# Patient Record
Sex: MALE | Race: ASIAN | NOT HISPANIC OR LATINO | Employment: UNEMPLOYED | URBAN - METROPOLITAN AREA
[De-identification: names, ages, dates, MRNs, and addresses within clinical notes are randomized per-mention and may not be internally consistent; named-entity substitution may affect disease eponyms.]

---

## 2017-10-20 ENCOUNTER — HOSPITAL ENCOUNTER (EMERGENCY)
Facility: HOSPITAL | Age: 27
Discharge: HOME | End: 2017-10-20
Attending: EMERGENCY MEDICINE

## 2017-10-20 VITALS
HEART RATE: 80 BPM | DIASTOLIC BLOOD PRESSURE: 82 MMHG | SYSTOLIC BLOOD PRESSURE: 140 MMHG | OXYGEN SATURATION: 100 % | TEMPERATURE: 98 F | RESPIRATION RATE: 16 BRPM

## 2017-10-20 DIAGNOSIS — S13.9XXA NECK SPRAIN, INITIAL ENCOUNTER: ICD-10-CM

## 2017-10-20 DIAGNOSIS — M54.6 BILATERAL THORACIC BACK PAIN, UNSPECIFIED CHRONICITY: Primary | ICD-10-CM

## 2017-10-20 LAB
ALBUMIN SERPL BCP-MCNC: 4.1 G/DL
ALP SERPL-CCNC: 84 U/L
ALT SERPL W/O P-5'-P-CCNC: 18 U/L
ANION GAP SERPL CALC-SCNC: 5 MMOL/L
AST SERPL-CCNC: 15 U/L
BASOPHILS # BLD AUTO: 0.01 K/UL
BASOPHILS NFR BLD: 0.1 %
BILIRUB SERPL-MCNC: 0.4 MG/DL
BUN SERPL-MCNC: 8 MG/DL
CALCIUM SERPL-MCNC: 10.8 MG/DL
CHLORIDE SERPL-SCNC: 109 MMOL/L
CO2 SERPL-SCNC: 24 MMOL/L
CREAT SERPL-MCNC: 0.7 MG/DL
DIFFERENTIAL METHOD: ABNORMAL
EOSINOPHIL # BLD AUTO: 0.1 K/UL
EOSINOPHIL NFR BLD: 1.1 %
ERYTHROCYTE [DISTWIDTH] IN BLOOD BY AUTOMATED COUNT: 17.2 %
EST. GFR  (AFRICAN AMERICAN): >60 ML/MIN/1.73 M^2
EST. GFR  (NON AFRICAN AMERICAN): >60 ML/MIN/1.73 M^2
GLUCOSE SERPL-MCNC: 95 MG/DL
HCT VFR BLD AUTO: 43.9 %
HGB BLD-MCNC: 13.5 G/DL
IMM GRANULOCYTES # BLD AUTO: 0.03 K/UL
IMM GRANULOCYTES NFR BLD AUTO: 0.4 %
LYMPHOCYTES # BLD AUTO: 2.2 K/UL
LYMPHOCYTES NFR BLD: 26.5 %
MCH RBC QN AUTO: 20.1 PG
MCHC RBC AUTO-ENTMCNC: 30.8 G/DL
MCV RBC AUTO: 65 FL
MONOCYTES # BLD AUTO: 0.8 K/UL
MONOCYTES NFR BLD: 9.3 %
NEUTROPHILS # BLD AUTO: 5.2 K/UL
NEUTROPHILS NFR BLD: 62.6 %
NRBC BLD-RTO: 0 /100 WBC
PLATELET # BLD AUTO: 222 K/UL
PMV BLD AUTO: 10.2 FL
POTASSIUM SERPL-SCNC: 4.3 MMOL/L
PROT SERPL-MCNC: 7.4 G/DL
RBC # BLD AUTO: 6.71 M/UL
SODIUM SERPL-SCNC: 138 MMOL/L
WBC # BLD AUTO: 8.26 K/UL

## 2017-10-20 PROCEDURE — 85025 COMPLETE CBC W/AUTO DIFF WBC: CPT

## 2017-10-20 PROCEDURE — 99283 EMERGENCY DEPT VISIT LOW MDM: CPT | Mod: ,,, | Performed by: EMERGENCY MEDICINE

## 2017-10-20 PROCEDURE — 96374 THER/PROPH/DIAG INJ IV PUSH: CPT

## 2017-10-20 PROCEDURE — 99284 EMERGENCY DEPT VISIT MOD MDM: CPT | Mod: 25

## 2017-10-20 PROCEDURE — 63600175 PHARM REV CODE 636 W HCPCS: Performed by: EMERGENCY MEDICINE

## 2017-10-20 PROCEDURE — 96372 THER/PROPH/DIAG INJ SC/IM: CPT

## 2017-10-20 PROCEDURE — 80053 COMPREHEN METABOLIC PANEL: CPT

## 2017-10-20 RX ORDER — ORPHENADRINE CITRATE 30 MG/ML
30 INJECTION INTRAMUSCULAR; INTRAVENOUS
Status: COMPLETED | OUTPATIENT
Start: 2017-10-20 | End: 2017-10-20

## 2017-10-20 RX ORDER — MORPHINE SULFATE 2 MG/ML
6 INJECTION, SOLUTION INTRAMUSCULAR; INTRAVENOUS
Status: COMPLETED | OUTPATIENT
Start: 2017-10-20 | End: 2017-10-20

## 2017-10-20 RX ORDER — IBUPROFEN 600 MG/1
600 TABLET ORAL EVERY 6 HOURS PRN
Qty: 20 TABLET | Refills: 0 | Status: SHIPPED | OUTPATIENT
Start: 2017-10-20

## 2017-10-20 RX ORDER — METHOCARBAMOL 500 MG/1
1000 TABLET, FILM COATED ORAL 3 TIMES DAILY
Qty: 30 TABLET | Refills: 0 | Status: SHIPPED | OUTPATIENT
Start: 2017-10-20 | End: 2017-10-25

## 2017-10-20 RX ADMIN — MORPHINE SULFATE 6 MG: 2 INJECTION, SOLUTION INTRAMUSCULAR; INTRAVENOUS at 07:10

## 2017-10-20 RX ADMIN — ORPHENADRINE CITRATE 30 MG: 30 INJECTION INTRAMUSCULAR; INTRAVENOUS at 07:10

## 2017-10-20 NOTE — ED PROVIDER NOTES
"Encounter Date: 10/20/2017    SCRIBE #1 NOTE: I, Kel Beltrán, am scribing for, and in the presence of,  Dr. Upton . I have scribed the entire note.       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 male with no pertinent hx that presents to the ED with a complaint of a fall. 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 He states he hit the middle of his back with the corner of the shelves. He endorses neck pain and shoulder pain and states his entire back is "on fire". He denies being able to walk after the fall because of pain and since it was a tight area where he fell, he didn't want to make a wrong move. He denies LOC, fever, chills, numbness or tingling in legs, and loss of sensation in genital area, but endorses loss of sensation in his rectal area.         The history is provided by the patient.     Review of patient's allergies indicates:  No Known Allergies  History reviewed. No pertinent past medical history.  History reviewed. No pertinent surgical history.  History reviewed. No pertinent family history.  Social History   Substance Use Topics    Smoking status: Current Some Day Smoker    Smokeless tobacco: Not on file    Alcohol use Yes      Comment: Socially     Review of Systems   Constitutional: Negative for chills and fever.   HENT: Negative for sore throat.    Respiratory: Negative for shortness of breath.    Cardiovascular: Negative for chest pain.   Gastrointestinal: Negative for nausea.   Genitourinary: Negative for dysuria.   Musculoskeletal: Positive for back pain and neck pain.        Pt endorses shoulder pain    Skin: Negative for rash.   Neurological: Negative for syncope, weakness and numbness.        " Pt denies tingling in lower extremities.Pt endorses loss of sensation in rectal area, but denies loss of sensation in genital area.    Hematological: Does not bruise/bleed easily.       Physical Exam     Initial Vitals [10/20/17 0541]   BP Pulse Resp Temp SpO2   138/78 78 18 98.5 °F (36.9 °C) 99 %      MAP       98         Physical Exam    Nursing note and vitals reviewed.  Constitutional: He appears well-developed and well-nourished. He is not diaphoretic. He appears distressed (Mild painful distress ).   HENT:   Head: Normocephalic and atraumatic.   Mouth/Throat: Oropharynx is clear and moist.   Eyes: EOM are normal. Pupils are equal, round, and reactive to light.   Neck: Normal range of motion. Neck supple. No JVD present.   Cardiovascular: Normal rate, regular rhythm, normal heart sounds and intact distal pulses.   Pulmonary/Chest: Breath sounds normal. No respiratory distress. He has no wheezes. He has no rhonchi. He has no rales.   Abdominal: Soft. He exhibits no distension. There is no tenderness.   Genitourinary:   Genitourinary Comments: Rectal tone in tact, sensation in tact, pt has no perianal anesthesia.    Musculoskeletal: Normal range of motion. He exhibits tenderness. He exhibits no edema.   There is midline cervical tenderness, no step off, pt has midline thoracic and lumbar tenderness    Lymphadenopathy:     He has no cervical adenopathy.   Neurological: He is alert and oriented to person, place, and time. He has normal strength. No cranial nerve deficit or sensory deficit.   Distal pulses in tact. Pt has 5/5 upper and lower extremity sensation in tact   Skin: Skin is warm and dry.         ED Course   Procedures  Labs Reviewed   CBC W/ AUTO DIFFERENTIAL - Abnormal; Notable for the following:        Result Value    RBC 6.71 (*)     Hemoglobin 13.5 (*)     MCV 65 (*)     MCH 20.1 (*)     MCHC 30.8 (*)     RDW 17.2 (*)     All other components within normal limits   COMPREHENSIVE METABOLIC PANEL -  Abnormal; Notable for the following:     Calcium 10.8 (*)     Anion Gap 5 (*)     All other components within normal limits          X-Rays:   Independently Interpreted Readings:   Head CT: No acute to cranial bleed or skull fracture   Other Readings:  Cervical, thoracic, lumbar spine: No acute fracture, dislocation, subluxation    Medical Decision Making:   History:   Old Medical Records: I decided to obtain old medical records.  Initial Assessment:   Emergent evaluation of a 27 y.o. male s/p fall from shelves. My initial differential diagnoses include but are not limited to: vertebral fracture, hematoma, muscle spasm, less likely cauda equina and less likely cord compression as he is neurologically in tact. Will image his spine and treat with muscle relaxer.       Labs show no significant abnormalities, vital signs are stable. CT of cervical/thoracic/ lumbar spine show no evidence of acute fracture or subluxation. Will discharge with pain medication and muscle relaxer.   Clinical Tests:   Lab Tests: Ordered and Reviewed  Radiological Study: Ordered and Reviewed            Scribe Attestation:   Scribe #1: I performed the above scribed service and the documentation accurately describes the services I performed. I attest to the accuracy of the note.    Attending Attestation:           Physician Attestation for Scribe:      Comments: I, Dr. Luba Upton, personally performed the services described in this documentation. All medical record entries made by the scribe were at my direction and in my presence.  I have reviewed the chart and agree that the record reflects my personal performance and is accurate and complete. Luba Upton MD.  2:23 PM 10/20/2017            ED Course      Clinical Impression:   The primary encounter diagnosis was Bilateral thoracic back pain, unspecified chronicity. A diagnosis of Neck sprain, initial encounter was also pertinent to this visit.    Disposition:   Disposition:  Discharged  Condition: Stable                        Luba Upton MD  10/20/17 1425       Luba Upton MD  10/20/17 1426

## 2017-10-20 NOTE — ED TRIAGE NOTES
Aj Vazquez, a 27 y.o. male presents to the ED after falling from shelves at work about 7 ft onto back. Pt complaining of back/neck pain. Pt arrived on back board and in c-collar. Denies LOC, numbness or tingling in legs or arms. Pt AAOx4. Tenderness to neck and upper midback.       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 No pertinent past medical history.     Adult Physical Assessment  LOC: Aj Vazquez, 27 y.o. male verified via two identifiers.  The patient is awake, alert, oriented and speaking appropriately at this time.  APPEARANCE: Patient resting comfortably and appears to be in no acute distress at this time. Patient is clean and well groomed, patient's clothing is properly fastened.  SKIN:The skin is warm and dry, color consistent with ethnicity, patient has normal skin turgor and moist mucus membranes, skin intact, no breakdown or brusing noted.  MUSCULOSKELETAL: Patient moving all extremities, denies numbness, tingling. C-collar in place. Tenderness to mid neck and upper back. No obvious swelling or deformities noted.  RESPIRATORY: Airway is open and patent, respirations are spontaneous, patient has a normal effort and rate, no accessory muscle use noted.  CARDIAC: Patient has a normal rate and rhythm, no periphreal edema noted in any extremity, capillary refill < 3 seconds in all extremities  ABDOMEN: Soft and non tender to palpation, no abdominal distention noted. Bowel sounds present in all four quadrants.  NEUROLOGIC: Eyes open spontaneously, behavior appropriate to situation, follows commands, facial expression symmetrical, bilateral hand grasp equal and even, purposeful motor response noted, normal sensation in all extremities when touched with a finger.

## 2024-10-09 ENCOUNTER — PATIENT OUTREACH (OUTPATIENT)
Dept: ADMINISTRATIVE | Facility: OTHER | Age: 34
End: 2024-10-09
Payer: MEDICAID

## 2024-10-09 NOTE — PROGRESS NOTES
CHW - Initial Contact    This Community Health Worker completed the Social Determinant of Health questionnaire with patient via telephone today.    Pt identified barriers of most importance are: No barriers reported.   Referrals to community agencies completed with patient/caregiver consent outside of [facility] include: No.  Referrals were put through [facility] - no:   Support and Services: No.  Other information discussed the patient needs / wants help with: SDOH completed. Patient did not report any barriers at this time, case will be closed.    Follow up required:No.

## 2024-10-10 ENCOUNTER — OFFICE VISIT (OUTPATIENT)
Dept: PRIMARY CARE CLINIC | Facility: CLINIC | Age: 34
End: 2024-10-10
Payer: MEDICAID

## 2024-10-10 VITALS
BODY MASS INDEX: 29.76 KG/M2 | TEMPERATURE: 98 F | OXYGEN SATURATION: 99 % | HEIGHT: 67 IN | SYSTOLIC BLOOD PRESSURE: 128 MMHG | WEIGHT: 189.63 LBS | RESPIRATION RATE: 18 BRPM | DIASTOLIC BLOOD PRESSURE: 81 MMHG | HEART RATE: 75 BPM

## 2024-10-10 DIAGNOSIS — M54.2 NECK PAIN: Primary | ICD-10-CM

## 2024-10-10 PROCEDURE — 99999 PR PBB SHADOW E&M-EST. PATIENT-LVL V: CPT | Mod: PBBFAC,,,

## 2024-10-10 PROCEDURE — 99215 OFFICE O/P EST HI 40 MIN: CPT | Mod: PBBFAC,PN

## 2024-10-10 RX ORDER — GABAPENTIN 100 MG/1
100 CAPSULE ORAL NIGHTLY
Qty: 90 CAPSULE | Refills: 0 | Status: SHIPPED | OUTPATIENT
Start: 2024-10-10 | End: 2025-01-08

## 2024-10-10 NOTE — PROGRESS NOTES
"  Subjective:       Patient ID: Aj Dunham is a 34 y.o. male.    Chief Complaint: Neck Pain and Back Pain      Aj Dunham is a 34 y.o. year old male  who comes to clinic for neck pain. Pt [place] speaking, used  services [facility] #[id number]    Patient complains of pain that is localized to the neck, radiating to bilateral hands and fingertips on occasion, rates as intensity 10/10, described as achy, symptoms have been present for 3 years but has significantly worsened over the past 3 months.  He works as a  in [employer]  Initial inciting event:  pt had accident in 2017, he fell on his back .   Symptoms are worst: throughout the day.   Alleviating factors identifiable by patient are  stretching .   Exacerbating factors identifiable by patient are  prolonged periods of immobilization .   Treatments so far initiated by patient:  NSAIDs    Previous workup: none.       ROS negative except as above  Objective:      /81   Pulse 75   Temp 98.3 °F (36.8 °C) (Oral)   Resp 18   Ht 5' 7" (1.702 m)   Wt 86 kg (189 lb 9.5 oz)   SpO2 99%   BMI 29.69 kg/m²    Physical Exam  Vitals reviewed.   Constitutional:       Appearance: Normal appearance.   HENT:      Head: Normocephalic.      Mouth/Throat:      Mouth: Mucous membranes are moist.   Neck:      Comments: Cervical spine exam:    Range of motion: full active and non-tender passive ROM on flexion, extension, rotation, lateral bending with negative pain on examination  Strength: full strength in hands and arms    Cardiovascular:      Rate and Rhythm: Normal rate and regular rhythm.      Pulses: Normal pulses.      Heart sounds: Normal heart sounds.   Pulmonary:      Effort: Pulmonary effort is normal.      Breath sounds: Normal breath sounds. No wheezing or rhonchi.   Abdominal:      General: Abdomen is flat. There is no distension.   Musculoskeletal:         General: No swelling. Normal range of motion.      Cervical back: Normal range of motion. No " tenderness.   Skin:     General: Skin is warm.      Coloration: Skin is not jaundiced.   Neurological:      Mental Status: He is alert.         Assessment:       1. Neck pain        Plan:       1. Neck pain (Primary)  -     chronic condition not at goal  - Will do pain medication and PT therapy and xrays. If no improvement in 3 months, will do MRI  - Discussed non-operative treatment options for addressing chronic neck pains. Including gabapentin/pregabalin to address nerve root irritation, anti-inflammatories (mobic, celebrex, ibuprofen, naproxen), physical therapy  - Provided with handout on neck exercises until he is able to see PT  - X-Ray Cervical Spine 2 or 3 Views; Future  - Ambulatory referral/consult to Physical/Occupational Therapy; Future  - gabapentin (NEURONTIN) 100 MG capsule; Take 1 capsule (100 mg total) by mouth every evening. Do not take if you will be driving that day.  Dispense: 90 capsule; Refill: 0          Follow up in about 3 months (around 1/10/2025) for wellness visit.      Yoandy Guevara M.D.

## 2024-10-14 ENCOUNTER — HOSPITAL ENCOUNTER (OUTPATIENT)
Dept: RADIOLOGY | Facility: HOSPITAL | Age: 34
Discharge: HOME | End: 2024-10-14
Payer: MEDICAID

## 2024-10-14 DIAGNOSIS — M54.2 NECK PAIN: ICD-10-CM

## 2024-10-14 PROCEDURE — 72040 X-RAY EXAM NECK SPINE 2-3 VW: CPT | Mod: TC

## 2024-10-14 PROCEDURE — 72040 X-RAY EXAM NECK SPINE 2-3 VW: CPT | Mod: 26,,, | Performed by: RADIOLOGY

## 2024-10-28 ENCOUNTER — CLINICAL SUPPORT (OUTPATIENT)
Dept: REHABILITATION | Facility: HOSPITAL | Age: 34
End: 2024-10-28
Payer: MEDICAID

## 2024-10-28 DIAGNOSIS — M43.6 LIMITATION OF JOINT MOTION OF NECK: Primary | ICD-10-CM

## 2024-10-28 DIAGNOSIS — M54.2 NECK PAIN: ICD-10-CM

## 2024-10-28 DIAGNOSIS — R29.898 SHOULDER WEAKNESS: ICD-10-CM

## 2024-10-28 DIAGNOSIS — R27.8 ABNORMAL MOTOR COORDINATION: ICD-10-CM

## 2024-10-28 PROCEDURE — 97161 PT EVAL LOW COMPLEX 20 MIN: CPT

## 2024-11-03 NOTE — PROGRESS NOTES
[facility]   Physical Therapy Treatment Note      Name: Aj Dunham  Clinic Number: [id number]    Therapy Diagnosis:   Encounter Diagnoses   Name Primary?    Limitation of joint motion of neck Yes    Shoulder weakness     Abnormal motor coordination      Physician: Yoandy Guevara MD    Visit Date: 11/4/2024  Physician Orders: PT Eval and Treat   Medical Diagnosis from Referral:   M54.2 (ICD-10-CM) - Neck pain   Evaluation Date: 10/28/2024  Authorization Period Expiration: 10/10/2025  Plan of Care Expiration: 12/28/2024  Progress Note Due: 11/28/2024  Visit # / Visits authorized: 1/1 1/20  FOTO: 1/3    PTA Visit #: 0/5     Precautions: Standard     Time In: 3:04 pm   Time Out: 4:00 pm   Total Billable Time: 56 minutes (39 Minutes One on One)     Subjective     Pt reports: That he is feeling slightly better but still has a fair amount of pain in the neck.  It really can get irritated at different times but over the weekend when he was driving he was able to manage it fairly well.    He was compliant with home exercise program.  Response to previous treatment: Slight Improvement in Pain levels   Functional change: Less Pain with Driving     Pain: Variable/10  Location: Neck and CT Junction      Objective      Objective Measures updated at progress report unless specified.     Treatment     Aj received the treatments listed below:      therapeutic exercises to develop strength, endurance, ROM, flexibility, and core stabilization for 39 minutes including:    UBE 1 Min FWD and 1 Min BWD 8 Min Level 1.5   Prone Mid-Trap Level 1 3 x 8 3 Sec Holds   Chin Tuck with Biofeedback 12 x 10 Sec Holds   Serratus Wall Slides 2 x 12   Upper Trap Level 3 2 x 12 3 Sec Holds   Hand Heel Rock Back (Cue for Cervical Posture) 2 x 12   Horizontal Abduction/Ts GTB (Cue for Scapular Squeeze) 3 x 10  No Money's GTB 2 x 12       manual therapy techniques: The techniques below were applied for 17 minutes:      OA Flexion Grade III-IV   OA Flexion MET   Cervical Right Side Glide C4-C6   Prone Thoracic PA Grade III-IV   Prone CT Distraction Grade II-III  Unilateral CT Mobilization Grade III       hot pack for 0 minutes to .    cold pack for 0 minutes to .    Patient Education and Home Exercises       Education provided:   - HEP   - Plan of Care   - Explanation of Findings   - Upper Crossed Syndrome   - Resulting Irritation From Excessive Mobility in One Hinge Point     Written Home Exercises Provided: Patient instructed to cont prior HEP. Exercises were reviewed and Aj was able to demonstrate them prior to the end of the session.  Aj demonstrated good  understanding of the education provided. See EMR under Patient Instructions for exercises provided during therapy sessions    Assessment     Aj presented to physical therapy with slight improvements from initial evaluation.  He continued to have severe limited CT junction and upper thoracic mobility with decreased right side glide mobility centered around C5.  Mobilization to C5 did provide some radiating symptoms down the right arm.  He continues to have significant motor control impairments with cervical extension and shows extreme tendency to fall into cervical extension with motion.  He worked to improve deep neck flexor strengthening which was very challenging and he had a lot of difficulty maintaining activation and positioning.  He worked to improver periscapular strengthening with overhead motion with specific care to limit and prevent compensation through the cervical spine and TL junction and to maintain a good neutral spine.  He completed the session with good levels of muscle fatigue and showed better ability to maintain control through the neck.  He will continue to need to build strengthening and control through the cervical spine while improve periscapular control and to offload the strain placed on the neck.  He will continue to be progressed as  appropriate.     Aj Is progressing well towards his goals.   Pt prognosis is Good.     Pt will continue to benefit from skilled outpatient physical therapy to address the deficits listed in the problem list box on initial evaluation, provide pt/family education and to maximize pt's level of independence in the home and community environment.     Pt's spiritual, cultural and educational needs considered and pt agreeable to plan of care and goals.     Anticipated barriers to physical therapy: Schedule     Goals:  Short Term Goals (4 Weeks):   1. Pt will be independent with HEP to supplement PT in improving pain free cervical mobility  2. Pt will improve cervical AROM quality of motion for 10 deg in flexion and side bending planes to improve cervical mobility for driving  3. Pt will improve UE MMTs by 1/2 grade in all planes to improve strength for lifting and carrying tasks.  4. Pt will demonstrate improved sitting posture to decrease pain experienced in head and neck.  Long Term Goals (8 Weeks):   1. Pt will improve FOTO to </=63% limitation to improve perceived limitation with changing and maintaining mobility.  2. Pt will improve cervical AROM to WNL in all planes to improve cervical mobility for driving   3. Pt will improve UE MMTs 1 grade in all planes to improve strength for lifting and carrying tasks.  4. Pt will demonstrate ability to hold 30 mmHg with deep neck flexion intervention to demonstrate improved deep cervical neck flexion.     5. Pt will report no pain with cervical AROM in all planes to promote QOL.   Plan      Plan of care Certification: 10/28/2024 to 12/28/2024.     Outpatient Physical Therapy 2 times weekly for 8 weeks to include the following interventions: Cervical/Lumbar Traction, Electrical Stimulation , Manual Therapy, Moist Heat/ Ice, Neuromuscular Re-ed, Orthotic Management and Training, Patient Education, Self Care, Therapeutic Activities, and Therapeutic Exercise.        José  Caio, PT

## 2024-11-04 ENCOUNTER — CLINICAL SUPPORT (OUTPATIENT)
Dept: REHABILITATION | Facility: HOSPITAL | Age: 34
End: 2024-11-04
Payer: MEDICAID

## 2024-11-04 DIAGNOSIS — R27.8 ABNORMAL MOTOR COORDINATION: ICD-10-CM

## 2024-11-04 DIAGNOSIS — M43.6 LIMITATION OF JOINT MOTION OF NECK: Primary | ICD-10-CM

## 2024-11-04 DIAGNOSIS — R29.898 SHOULDER WEAKNESS: ICD-10-CM

## 2024-11-04 PROCEDURE — 97110 THERAPEUTIC EXERCISES: CPT

## 2024-11-11 ENCOUNTER — CLINICAL SUPPORT (OUTPATIENT)
Dept: REHABILITATION | Facility: HOSPITAL | Age: 34
End: 2024-11-11
Payer: MEDICAID

## 2024-11-11 DIAGNOSIS — M43.6 LIMITATION OF JOINT MOTION OF NECK: Primary | ICD-10-CM

## 2024-11-11 DIAGNOSIS — R27.8 ABNORMAL MOTOR COORDINATION: ICD-10-CM

## 2024-11-11 DIAGNOSIS — R29.898 SHOULDER WEAKNESS: ICD-10-CM

## 2024-11-11 PROCEDURE — 97110 THERAPEUTIC EXERCISES: CPT

## 2024-11-11 NOTE — PROGRESS NOTES
[facility]   Physical Therapy Treatment Note      Name: Aj Dunham  Clinic Number: [id number]    Therapy Diagnosis:   Encounter Diagnoses   Name Primary?    Limitation of joint motion of neck Yes    Shoulder weakness     Abnormal motor coordination      Physician: Yoandy Guevara MD    Visit Date: 11/11/2024  Physician Orders: PT Eval and Treat   Medical Diagnosis from Referral:   M54.2 (ICD-10-CM) - Neck pain   Evaluation Date: 10/28/2024  Authorization Period Expiration: 10/10/2025  Plan of Care Expiration: 12/28/2024  Progress Note Due: 11/28/2024  Visit # / Visits authorized: 1/1 2/20  FOTO: 1/3    PTA Visit #: 0/5     Precautions: Standard     Time In: 3:08 pm   Time Out: 4:01 pm   Total Billable Time: 53 minutes (53 Minutes One on One)     Subjective     Pt reports: That he is very tired and had a long weekend but he might feel overall about 20% better through the neck.  He has less severity and frequency of the stabbing pain in the neck with different movements.      He was compliant with home exercise program.  Response to previous treatment: Slight Improvement in Pain levels   Functional change: Less Pain with Driving     Pain: Variable/10  Location: Neck and CT Junction      Objective      Objective Measures updated at progress report unless specified.     Treatment     Aj received the treatments listed below:      therapeutic exercises to develop strength, endurance, ROM, flexibility, and core stabilization for 39 minutes including:    UBE 1 Min FWD and 1 Min BWD 8 Min Level 3  Prone Mid-Trap Level 1 3 x 8 3 Sec Holds   Prone Low-Trap Level 2 3 x 8 3 Sec Holds   Chin Tuck with Biofeedback 10 x 10 Sec Holds   Chin Tuck with Biofeedback and UE Flexion 1 x 12   Serratus Wall Slides 2 x 12   Low Trap Lift Off/Y's YTB 2 x 12   Horizontal Abduction/Ts GTB (Cue for Scapular Squeeze) 3 x 10  Open Books GTB 1 x 15 Each Way   Upper Trap Level 3 2 x 12 3 Sec Holds     Not Today:    Hand Heel Rock Back (Cue for Cervical Posture) 2 x 12   No Money's GTB 2 x 12     manual therapy techniques: The techniques below were applied for 14 minutes:     OA Flexion Grade III-IV   OA Flexion MET   Cervical Right Side Glide C4-C6   Prone Thoracic PA Grade III-IV   Prone CT Distraction Grade II-III    Not Today:   Unilateral CT Mobilization Grade III       hot pack for 0 minutes to .    cold pack for 0 minutes to .    Patient Education and Home Exercises       Education provided:   - HEP   - Plan of Care   - Explanation of Findings   - Upper Crossed Syndrome   - Resulting Irritation From Excessive Mobility in One Hinge Point     Written Home Exercises Provided: Patient instructed to cont prior HEP. Exercises were reviewed and Aj was able to demonstrate them prior to the end of the session.  Aj demonstrated good  understanding of the education provided. See EMR under Patient Instructions for exercises provided during therapy sessions    Assessment     Aj presented to physical therapy with a self-reported 20% improvement in symptoms.  He continues to have severe shearing into extension at C6 with limited mobility through the CT junction and upper cervical spine.  Manual interventions were performed to address limitation of OA flexion and upper cervical side glides as well at upper thoracic motion.  He continued to work to address deep neck flexor strengthening and control with progression to include upper extremity motion.   Periscapular strengthening continued to be a primary focus of session to address posture and decrease resting strain placed on the spine.  With overhead motions, cueing was provided to maintain a good neutral spine with decrease irritation improved motor control.  He showed improvements in control and strengthening and demonstrated expected fatigue.  He will continue to need to build strengthening and control through the cervical spine while improve periscapular control and to  offload the strain placed on the neck.  He will continue to be progressed as appropriate.     Aj Is progressing well towards his goals.   Pt prognosis is Good.     Pt will continue to benefit from skilled outpatient physical therapy to address the deficits listed in the problem list box on initial evaluation, provide pt/family education and to maximize pt's level of independence in the home and community environment.     Pt's spiritual, cultural and educational needs considered and pt agreeable to plan of care and goals.     Anticipated barriers to physical therapy: Schedule     Goals:  Short Term Goals (4 Weeks):   1. Pt will be independent with HEP to supplement PT in improving pain free cervical mobility  2. Pt will improve cervical AROM quality of motion for 10 deg in flexion and side bending planes to improve cervical mobility for driving  3. Pt will improve UE MMTs by 1/2 grade in all planes to improve strength for lifting and carrying tasks.  4. Pt will demonstrate improved sitting posture to decrease pain experienced in head and neck.  Long Term Goals (8 Weeks):   1. Pt will improve FOTO to </=63% limitation to improve perceived limitation with changing and maintaining mobility.  2. Pt will improve cervical AROM to WNL in all planes to improve cervical mobility for driving   3. Pt will improve UE MMTs 1 grade in all planes to improve strength for lifting and carrying tasks.  4. Pt will demonstrate ability to hold 30 mmHg with deep neck flexion intervention to demonstrate improved deep cervical neck flexion.     5. Pt will report no pain with cervical AROM in all planes to promote QOL.   Plan      Plan of care Certification: 10/28/2024 to 12/28/2024.     Outpatient Physical Therapy 2 times weekly for 8 weeks to include the following interventions: Cervical/Lumbar Traction, Electrical Stimulation , Manual Therapy, Moist Heat/ Ice, Neuromuscular Re-ed, Orthotic Management and Training, Patient Education,  Self Care, Therapeutic Activities, and Therapeutic Exercise.        José Kearney, PT

## 2024-11-18 ENCOUNTER — CLINICAL SUPPORT (OUTPATIENT)
Dept: REHABILITATION | Facility: HOSPITAL | Age: 34
End: 2024-11-18
Payer: MEDICAID

## 2024-11-18 DIAGNOSIS — R29.898 SHOULDER WEAKNESS: ICD-10-CM

## 2024-11-18 DIAGNOSIS — R27.8 ABNORMAL MOTOR COORDINATION: ICD-10-CM

## 2024-11-18 DIAGNOSIS — M43.6 LIMITATION OF JOINT MOTION OF NECK: Primary | ICD-10-CM

## 2024-11-18 PROCEDURE — 97110 THERAPEUTIC EXERCISES: CPT

## 2024-11-18 NOTE — PROGRESS NOTES
[facility]   Physical Therapy Treatment Note      Name: Aj Dunham  Clinic Number: [id number]    Therapy Diagnosis:   Encounter Diagnoses   Name Primary?    Limitation of joint motion of neck Yes    Shoulder weakness     Abnormal motor coordination        Physician: Yoandy Guevara MD    Visit Date: 11/18/2024  Physician Orders: PT Eval and Treat   Medical Diagnosis from Referral:   M54.2 (ICD-10-CM) - Neck pain   Evaluation Date: 10/28/2024  Authorization Period Expiration: 10/10/2025  Plan of Care Expiration: 12/28/2024  Progress Note Due: 11/28/2024  Visit # / Visits authorized: 1/1 3/20  FOTO: 1/3    PTA Visit #: 0/5     Precautions: Standard     Time In: 302pm  Time Out: 350pm  Total Billable Time: 48 minutes    Subjective     Pt reports: that his neck is doing fine today. He still has the pain in the back of the neck at this time. Overall he feels like he may has made some progress with treatment but he still gets his neck pain.   He was compliant with home exercise program.  Response to previous treatment: Slight Improvement in Pain levels   Functional change: Less Pain with Driving     Pain: Variable/10  Location: Neck and CT Junction      Objective      Objective Measures updated at progress report unless specified.     Treatment     Aj received the treatments listed below:      therapeutic exercises to develop strength, endurance, ROM, flexibility, and core stabilization for 36 minutes including:    UBE, 3' forward and backward with level 3 resistance   Seated chicken wings, 2 x 10  Open books, 15x each side   Seated thoracic extension over a chair, 3 x 8  Chin Tuck with Biofeedback 10 x 10 Sec Holds   Chin Tuck with Biofeedback and UE Flexion with 2#  1 x 15 each side  Serratus Wall Slides 2 x 12   Prone Mid-Trap Level 2 3 x 8 3 Sec Holds   Prone Low-Trap Level 2 3 x 8 3 Sec Holds     Not Today:   Low Trap Lift Off/Y's YTB 2 x 12   Horizontal Abduction/Ts GTB (Cue for  "Scapular Squeeze) 3 x 10  Upper Trap Level 3 2 x 12 3 Sec Holds   Hand Heel Rock Back (Cue for Cervical Posture) 2 x 12   No Money's GTB 2 x 12     manual therapy techniques: The techniques below were applied for 12 minutes:     OA Flexion Grade III-IV   MET to improve upper cervical rotation to the R and L, 4 x 5" holds  Cervical Right Side Glide C4-C6   Prone Thoracic PA Grade III-IV   Prone CT Distraction Grade II-III    Not Today:   OA Flexion MET   Unilateral CT Mobilization Grade III       Patient Education and Home Exercises       Education provided:   - HEP   - Plan of Care   - Explanation of Findings   - Upper Crossed Syndrome   - Resulting Irritation From Excessive Mobility in One Hinge Point     Written Home Exercises Provided: Patient instructed to cont prior HEP. Exercises were reviewed and Aj was able to demonstrate them prior to the end of the session.  Aj demonstrated good  understanding of the education provided. See EMR under Patient Instructions for exercises provided during therapy sessions    Assessment     Aj presents today with continued reports of cervical pain that does worsen with AROM. His CT junction pain that is reproduced with cervical flexion did respond well to manual interventions today. He struggled a but with thoracic spine manual interventions due to TTP so PT's force was modified accordingly. Deep neck flexor and periscapular control activities were challenging for him today.    Aj Is progressing well towards his goals.   Pt prognosis is Good.     Pt will continue to benefit from skilled outpatient physical therapy to address the deficits listed in the problem list box on initial evaluation, provide pt/family education and to maximize pt's level of independence in the home and community environment.     Pt's spiritual, cultural and educational needs considered and pt agreeable to plan of care and goals.     Anticipated barriers to physical therapy: Schedule "     Goals:  Short Term Goals (4 Weeks):   1. Pt will be independent with HEP to supplement PT in improving pain free cervical mobility  2. Pt will improve cervical AROM quality of motion for 10 deg in flexion and side bending planes to improve cervical mobility for driving  3. Pt will improve UE MMTs by 1/2 grade in all planes to improve strength for lifting and carrying tasks.  4. Pt will demonstrate improved sitting posture to decrease pain experienced in head and neck.  Long Term Goals (8 Weeks):   1. Pt will improve FOTO to </=63% limitation to improve perceived limitation with changing and maintaining mobility.  2. Pt will improve cervical AROM to WNL in all planes to improve cervical mobility for driving   3. Pt will improve UE MMTs 1 grade in all planes to improve strength for lifting and carrying tasks.  4. Pt will demonstrate ability to hold 30 mmHg with deep neck flexion intervention to demonstrate improved deep cervical neck flexion.     5. Pt will report no pain with cervical AROM in all planes to promote QOL.   Plan      Plan of care Certification: 10/28/2024 to 12/28/2024.     Outpatient Physical Therapy 2 times weekly for 8 weeks to include the following interventions: Cervical/Lumbar Traction, Electrical Stimulation , Manual Therapy, Moist Heat/ Ice, Neuromuscular Re-ed, Orthotic Management and Training, Patient Education, Self Care, Therapeutic Activities, and Therapeutic Exercise.      TOMY MICHAEL, PT

## 2025-01-10 ENCOUNTER — OFFICE VISIT (OUTPATIENT)
Dept: PRIMARY CARE CLINIC | Facility: CLINIC | Age: 35
End: 2025-01-10
Payer: MEDICAID

## 2025-01-10 VITALS
HEART RATE: 69 BPM | HEIGHT: 67 IN | WEIGHT: 193.25 LBS | DIASTOLIC BLOOD PRESSURE: 69 MMHG | OXYGEN SATURATION: 97 % | SYSTOLIC BLOOD PRESSURE: 112 MMHG | BODY MASS INDEX: 30.33 KG/M2

## 2025-01-10 DIAGNOSIS — Z11.4 ENCOUNTER FOR SCREENING FOR HIV: ICD-10-CM

## 2025-01-10 DIAGNOSIS — Z23 FLU VACCINE NEED: ICD-10-CM

## 2025-01-10 DIAGNOSIS — M54.6 ACUTE MIDLINE THORACIC BACK PAIN: ICD-10-CM

## 2025-01-10 DIAGNOSIS — Z11.59 ENCOUNTER FOR HEPATITIS C SCREENING TEST FOR LOW RISK PATIENT: ICD-10-CM

## 2025-01-10 DIAGNOSIS — Z00.00 ENCOUNTER FOR ANNUAL WELLNESS VISIT: Primary | ICD-10-CM

## 2025-01-10 PROCEDURE — 99213 OFFICE O/P EST LOW 20 MIN: CPT | Mod: PBBFAC,PN

## 2025-01-10 PROCEDURE — 1160F RVW MEDS BY RX/DR IN RCRD: CPT | Mod: CPTII,,,

## 2025-01-10 PROCEDURE — 99395 PREV VISIT EST AGE 18-39: CPT | Mod: S$PBB,,,

## 2025-01-10 PROCEDURE — 3008F BODY MASS INDEX DOCD: CPT | Mod: CPTII,,,

## 2025-01-10 PROCEDURE — 3044F HG A1C LEVEL LT 7.0%: CPT | Mod: CPTII,,,

## 2025-01-10 PROCEDURE — 3074F SYST BP LT 130 MM HG: CPT | Mod: CPTII,,,

## 2025-01-10 PROCEDURE — 99213 OFFICE O/P EST LOW 20 MIN: CPT | Mod: S$PBB,25,,

## 2025-01-10 PROCEDURE — 90656 IIV3 VACC NO PRSV 0.5 ML IM: CPT | Mod: PBBFAC,PN

## 2025-01-10 PROCEDURE — 99999PBSHW PR PBB SHADOW TECHNICAL ONLY FILED TO HB: Mod: PBBFAC,,,

## 2025-01-10 PROCEDURE — 3078F DIAST BP <80 MM HG: CPT | Mod: CPTII,,,

## 2025-01-10 PROCEDURE — 90471 IMMUNIZATION ADMIN: CPT | Mod: PBBFAC,PN

## 2025-01-10 PROCEDURE — 1159F MED LIST DOCD IN RCRD: CPT | Mod: CPTII,,,

## 2025-01-10 PROCEDURE — 99999 PR PBB SHADOW E&M-EST. PATIENT-LVL III: CPT | Mod: PBBFAC,,,

## 2025-01-10 RX ORDER — CYCLOBENZAPRINE HCL 10 MG
10 TABLET ORAL 3 TIMES DAILY PRN
Qty: 30 TABLET | Refills: 0 | Status: SHIPPED | OUTPATIENT
Start: 2025-01-10 | End: 2025-01-20

## 2025-01-10 RX ADMIN — INFLUENZA VIRUS VACCINE 0.5 ML: 15; 15; 15 SUSPENSION INTRAMUSCULAR at 01:01

## 2025-01-13 NOTE — PROGRESS NOTES
"  Subjective:       Patient ID: Aj Dunham is a 34 y.o. male.    Chief Complaint:  Annual wellness visit  Follow-up      Aj Dunham is a 34 y.o. year old male  who comes to clinic for annual wellness visit and back pain    Patient's history of upper back pain, last visit he was sent to physical therapy.  He had for appointment with physical therapy and stopped attending sessions.  Patient states he felt like he was not improving and stopped going.  Patient states he sometimes feel pain, he was unable to vocalize if pain is better or worse.     ROS negative except as above  Objective:      /69 (BP Location: Right arm, Patient Position: Sitting)   Pulse 69   Ht 5' 7" (1.702 m)   Wt 87.6 kg (193 lb 3.7 oz)   SpO2 97%   BMI 30.26 kg/m²    Physical Exam  Vitals reviewed.   Constitutional:       Appearance: Normal appearance.   HENT:      Head: Normocephalic.      Mouth/Throat:      Mouth: Mucous membranes are moist.   Cardiovascular:      Rate and Rhythm: Normal rate and regular rhythm.      Pulses: Normal pulses.      Heart sounds: Normal heart sounds.   Pulmonary:      Effort: Pulmonary effort is normal.      Breath sounds: Normal breath sounds. No wheezing or rhonchi.   Abdominal:      General: Abdomen is flat. There is no distension.   Musculoskeletal:         General: No swelling. Normal range of motion.      Cervical back: Normal range of motion.   Skin:     General: Skin is warm.      Coloration: Skin is not jaundiced.   Neurological:      Mental Status: He is alert.         Assessment:       1. Encounter for annual wellness visit    2. Acute midline thoracic back pain    3. Encounter for screening for HIV    4. Encounter for hepatitis C screening test for low risk patient    5. Flu vaccine need        Plan:       1. Encounter for annual wellness visit (Primary)  - Discussed age and gender appropriate screenings at this visit and encouraged a healthy diet low in simple carbohydrates, and increased " physical activity.  Counseled on medically appropriate vaccines based on age and current health condition.  Screening test reviewed and discussed with patient.     - CBC Auto Differential; Future  - Comprehensive Metabolic Panel; Future  - Hemoglobin A1C; Future  - Lipid Panel; Future    2. Acute midline thoracic back pain    - cyclobenzaprine (FLEXERIL) 10 MG tablet; Take 1 tablet (10 mg total) by mouth 3 (three) times daily as needed for Muscle spasms.  Dispense: 30 tablet; Refill: 0    3. Encounter for screening for HIV    - HIV 1/2 Ag/Ab (4th Gen); Future    4. Encounter for hepatitis C screening test for low risk patient    - Hepatitis C Antibody; Future    5. Flu vaccine need    - influenza (Flulaval, Fluzone, Fluarix) 45 mcg/0.5 mL IM vaccine (> or = 6 mo) 0.5 mL          Follow up in about 3 months (around 4/10/2025) for back pain follow up.      Yoandy Guevara M.D.

## 2025-04-10 ENCOUNTER — OFFICE VISIT (OUTPATIENT)
Dept: PRIMARY CARE CLINIC | Facility: CLINIC | Age: 35
End: 2025-04-10
Payer: MEDICAID

## 2025-04-10 VITALS
HEART RATE: 62 BPM | TEMPERATURE: 98 F | SYSTOLIC BLOOD PRESSURE: 120 MMHG | OXYGEN SATURATION: 99 % | DIASTOLIC BLOOD PRESSURE: 88 MMHG | WEIGHT: 186.31 LBS | BODY MASS INDEX: 29.24 KG/M2 | HEIGHT: 67 IN

## 2025-04-10 DIAGNOSIS — M54.6 PAIN IN THORACIC SPINE: ICD-10-CM

## 2025-04-10 DIAGNOSIS — M54.2 CERVICALGIA: Primary | ICD-10-CM

## 2025-04-10 PROCEDURE — 99213 OFFICE O/P EST LOW 20 MIN: CPT | Mod: PBBFAC,PN

## 2025-04-10 PROCEDURE — 99999 PR PBB SHADOW E&M-EST. PATIENT-LVL III: CPT | Mod: PBBFAC,,,
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
## 2025-04-24 ENCOUNTER — HOSPITAL ENCOUNTER (OUTPATIENT)
Dept: RADIOLOGY | Facility: HOSPITAL | Age: 35
Discharge: HOME | End: 2025-04-24
Payer: MEDICAID

## 2025-04-24 DIAGNOSIS — M54.2 CERVICALGIA: ICD-10-CM

## 2025-04-24 DIAGNOSIS — M54.6 PAIN IN THORACIC SPINE: ICD-10-CM

## 2025-04-24 PROCEDURE — 72146 MRI CHEST SPINE W/O DYE: CPT | Mod: 26,,, | Performed by: INTERNAL MEDICINE

## 2025-04-24 PROCEDURE — 72141 MRI NECK SPINE W/O DYE: CPT | Mod: 26,,, | Performed by: INTERNAL MEDICINE

## 2025-04-24 PROCEDURE — 72141 MRI NECK SPINE W/O DYE: CPT | Mod: TC

## 2025-04-24 PROCEDURE — 72146 MRI CHEST SPINE W/O DYE: CPT | Mod: TC

## 2025-05-19 ENCOUNTER — PATIENT MESSAGE (OUTPATIENT)
Dept: PRIMARY CARE CLINIC | Facility: CLINIC | Age: 35
End: 2025-05-19
Payer: MEDICAID